# Patient Record
(demographics unavailable — no encounter records)

---

## 2025-01-20 NOTE — HISTORY OF PRESENT ILLNESS
[TextBox_4] : 64F with hx of HTN, GERD/barretts esophagus and hypothyroidism who presents for cough and wheeze follow up from [facility] ER.   Went to ER 1/10/2025 + for flu and COVID. Also recently had norovirus. The cough has been since Thanksgiving after COVID, given 2 different course of abx cefdinir and erythromycin. Using flonase but does not help. Given medrol dose juan but had side effects so didn't finish and was given albuterol spacer which she doesn't feel helps. Though albuterol neb has helped.  CXR in ER normal. .    Today,  ****

## 2025-01-20 NOTE — HISTORY OF PRESENT ILLNESS
[TextBox_4] : 64F with hx of HTN, GERD/barretts esophagus and hypothyroidism who presents for cough and wheeze follow up from [facility] ER.   Went to ER 1/10/2025 + for flu and COVID. Also recently had norovirus. The cough has been since Thanksgiving after COVID, given 2 different course of abx cefdinir and erythromycin. Using flonase but does not help. Given medrol dose juan but had side effects so didn't finish and was given albuterol spacer which she doesn't feel helps. Though albuterol neb has helped.  CXR in ER normal. .    Today,  ****

## 2025-01-20 NOTE — HISTORY OF PRESENT ILLNESS
[TextBox_4] : 64F with hx of HTN, GERD/barretts esophagus and hypothyroidism who presents for cough and wheeze follow up from [facility] ER.   Went to ER 1/10/2025 + for flu and COVID. Also recently had norovirus. The cough has been since Thanksgiving after COVID, given 2 different course of abx cefdinir and erythromycin. Using flonase but does not help. Given medrol dose juan but had side effects so didn't finish and was given albuterol spacer which she doesn't feel helps. Though albuterol neb has helped.  CXR in ER normal. .    Today,  ****